# Patient Record
Sex: MALE | Race: WHITE | NOT HISPANIC OR LATINO | URBAN - METROPOLITAN AREA
[De-identification: names, ages, dates, MRNs, and addresses within clinical notes are randomized per-mention and may not be internally consistent; named-entity substitution may affect disease eponyms.]

---

## 2024-10-18 ENCOUNTER — OFFICE VISIT (OUTPATIENT)
Dept: URGENT CARE | Age: 34
End: 2024-10-18
Payer: COMMERCIAL

## 2024-10-18 VITALS
DIASTOLIC BLOOD PRESSURE: 88 MMHG | TEMPERATURE: 97.7 F | HEART RATE: 56 BPM | RESPIRATION RATE: 18 BRPM | BODY MASS INDEX: 28.03 KG/M2 | SYSTOLIC BLOOD PRESSURE: 134 MMHG | WEIGHT: 178.57 LBS | HEIGHT: 67 IN | OXYGEN SATURATION: 98 %

## 2024-10-18 DIAGNOSIS — R05.1 ACUTE COUGH: Primary | ICD-10-CM

## 2024-10-18 RX ORDER — BENZONATATE 200 MG/1
200 CAPSULE ORAL 3 TIMES DAILY PRN
Qty: 42 CAPSULE | Refills: 0 | Status: SHIPPED | OUTPATIENT
Start: 2024-10-18 | End: 2024-11-17

## 2024-10-18 RX ORDER — BROMPHENIRAMINE MALEATE, PSEUDOEPHEDRINE HYDROCHLORIDE, AND DEXTROMETHORPHAN HYDROBROMIDE 2; 30; 10 MG/5ML; MG/5ML; MG/5ML
10 SYRUP ORAL 4 TIMES DAILY PRN
Qty: 250 ML | Refills: 0 | Status: SHIPPED | OUTPATIENT
Start: 2024-10-18 | End: 2024-10-28

## 2024-10-18 ASSESSMENT — ENCOUNTER SYMPTOMS: COUGH: 1

## 2024-10-18 NOTE — PROGRESS NOTES
"Subjective   Patient ID: Osei Smith is a 33 y.o. male. They present today with a chief complaint of Cough (Cough x 2 weeks post nasal drip hoarse . Worse at night).    History of Present Illness  Patient is a pleasant 33-year-old white male, no significant past medical history, presented to clinic with chief complaint of cough.  Patient is reporting 1 week history of persistent dry cough that has persisted since he got over a upper respiratory infection about 1 week ago.  He denies any fever or chills.  No sputum production.  He denies any upper respiratory congestion however does report some postnasal drainage especially at night when his cough is worse.  Abdominal pain, nausea, vomiting.  No myalgias, arthralgias, generalized weakness, fatigue, numbness, tingling, or focal weakness.        Cough        Past Medical History  Allergies as of 10/18/2024    (No Known Allergies)       (Not in a hospital admission)         Past Medical History:   Diagnosis Date    Personal history of diseases of the skin and subcutaneous tissue     History of impetigo    Personal history of diseases of the skin and subcutaneous tissue     History of eczema    Personal history of other diseases of the respiratory system     History of sinusitis    Personal history of other mental and behavioral disorders     History of depression       No past surgical history on file.         Review of Systems  Review of Systems   Respiratory:  Positive for cough.         All review of systems negative unless stated in HPI.                           Objective    Vitals:    10/18/24 1551   BP: 134/88   Pulse: 56   Resp: 18   Temp: 36.5 °C (97.7 °F)   TempSrc: Oral   SpO2: 98%   Weight: 81 kg (178 lb 9.2 oz)   Height: 1.702 m (5' 7\")     No LMP for male patient.    Physical Exam  General: Vitals Noted. No distress. Normocephalic.     HEENT: TMs normal, EOMI, normal conjunctiva, patent nares with erythematous edematous and appear nasal turbinates and " clear rhinorrhea bilaterally.  Posterior oropharynx with signs of postnasal drainage without any erythema swelling or tonsillar exudate.  Uvula is in the midline and nonedematous.  No drooling.  No trismus.    Neck: Supple with no adenopathy.     Cardiac: Regular Rate and Rhythm. No murmur.     Pulmonary: Equal breath sounds bilaterally. No wheezes, rhonchi, or rales.    Abdomen: Soft, non-tender, with normal bowel sounds.     Musculoskeletal: Moves all extremities, no effusion, no edema.     Skin: No obvious rashes.  Procedures    Point of Care Test & Imaging Results from this visit    No results found.    Diagnostic study results (if any) were reviewed by Boyd Markham PA-C.    Assessment/Plan   Allergies, medications, history, and pertinent labs/EKGs/Imaging reviewed by Boyd Markham PA-C.     Medical Decision Making  Patient was seen eval in the clinic for complaint of persistent cough.  On exam patient is nontoxic well-appearing respect comfortably no acute distress.  Vital signs are stable, afebrile.  Chest clear, heart is regular, belly soft and nontender.  ENT exam as above significant for moderate amount of postnasal drainage.  I feel cough is likely secondary to upper respiratory congestion and drainage.  Will provide Bromfed-DM to use as needed for cough at night and provided Tessalon Perles to use as needed for cough during the day.  Minimal concern for acute bronchitis or pneumonia given very clear breath sounds at this time.  No concern for acute sinusitis.  No otitis media.  We discharged home at this time discharged to follow-up with primary care physician in the next week.  I reviewed my impression, plan, strict return precautions with the patient he expresses understanding and agreement with this plan.      Orders and Diagnoses  Diagnoses and all orders for this visit:  Acute cough  -     brompheniramine-pseudoeph-DM 2-30-10 mg/5 mL syrup; Take 10 mL by mouth 4 times a day as needed  for congestion or cough for up to 10 days.  -     benzonatate (Tessalon) 200 mg capsule; Take 1 capsule (200 mg) by mouth 3 times a day as needed for cough. Do not crush or chew.        Medical Admin Record      Follow Up Instructions  No follow-ups on file.    Patient disposition: Home    Electronically signed by Boyd Markham PA-C  4:24 PM

## 2025-04-04 ENCOUNTER — OFFICE VISIT (OUTPATIENT)
Dept: URGENT CARE | Age: 35
End: 2025-04-04
Payer: COMMERCIAL

## 2025-04-04 DIAGNOSIS — J02.9 SORE THROAT: Primary | ICD-10-CM

## 2025-04-04 ASSESSMENT — ENCOUNTER SYMPTOMS
SORE THROAT: 1
VOMITING: 0
SHORTNESS OF BREATH: 0
STRIDOR: 1
TROUBLE SWALLOWING: 1
VOICE CHANGE: 0
HOARSE VOICE: 1
SWOLLEN GLANDS: 1
DIARRHEA: 0
FATIGUE: 1
HEADACHES: 0
ABDOMINAL PAIN: 0
RHINORRHEA: 1
COUGH: 1
NECK PAIN: 0

## 2025-04-04 NOTE — PATIENT INSTRUCTIONS
As discussed, for safety reasons and proper evaluation please be seen in office for further evaluation

## 2025-04-04 NOTE — PROGRESS NOTES
Urgent Care Virtual Video Visit    Virtual or Telephone Consent  Patient confirmed by name and date of birth      An interactive audio and video telecommunication system which permits real time communications between the patient (at the originating site) and provider (at the distant site) was utilized to provide this telehealth service.   Verbal consent was requested and obtained from Osei Smith on this date, 04/04/25 for a telehealth visit and the patient's location was confirmed at the time of the visit.    Patient Location: Home  Provider Location: [facility]      Chief Complaint   Patient presents with    Sore Throat     X10 days       Past Medical History:   Diagnosis Date    Personal history of diseases of the skin and subcutaneous tissue     History of impetigo    Personal history of diseases of the skin and subcutaneous tissue     History of eczema    Personal history of other diseases of the respiratory system     History of sinusitis    Personal history of other mental and behavioral disorders     History of depression       Medication Documentation Review Audit       Reviewed by Kristin L Schoenlein, APRN-CNP (Nurse Practitioner) on 04/04/25 at 1737      Medication Order Taking? Sig Documenting Provider Last Dose Status            No Medications to Display                                   No Known Allergies    34-year-old male seen today via virtual visit with a complaint of sore throat.  Patient states has been sick for the last 10 days.  Accompanying symptoms are nasal congestion, runny nose, thick mucus, coughing up mucus, and pain with swallowing.  States wife was just seen for similar symptoms and was treated for strep throat.  He is unsure what she was treated with.  States been taking DayQuil and NyQuil which is helping  but he does not feel that he is getting better.  Denies inability to open mouth, inability to swallow, drooling.  Denies shortness of breath.  Has been taking his temperature, no fever, but is unsure if his thermometer is working correctly.      Sore Throat   Associated symptoms include congestion and coughing. Pertinent negatives include no headaches or shortness of breath.       Review of Systems   Constitutional:  Positive for fatigue.   HENT:  Positive for congestion, rhinorrhea and sore throat. Negative for voice change.    Respiratory:  Positive for cough. Negative for shortness of breath.    Cardiovascular:  Negative for chest pain.   Neurological:  Negative for headaches.       Physical Exam  Constitutional:       General: He is not in acute distress.     Appearance: Normal appearance. He is not ill-appearing or toxic-appearing.   HENT:      Head: Normocephalic and atraumatic.   Eyes:      Conjunctiva/sclera: Conjunctivae normal.   Pulmonary:      Effort: Pulmonary effort is normal.   Neurological:      General: No focal deficit present.      Mental Status: He is alert and oriented to person, place, and time.         MDM  Patient no acute distress, nontoxic-appearing, speaking in full sentences, without trismus, nasal retractions/flaring, stridor, tachypnea, increased work of breathing.  Patient is alert into x 3.  Due to lack of exam, I do not feel I can properly treat this patient.  Discussed this with patient.  Encouraged him to be seen in clinic for further follow-up.  All questions answered and addressed.  Patient is in agreement of plan of care and verbalized understanding.      Video visit completed with realtime synchronous video/audio connection. Informed consent was obtained from the patient. Patient was made aware that my evaluation and diagnosis are limited due to the fact that we are not in the same room during the interview and that this is a virtual encounter that took place via  videoconferencing. Patient verbalized understanding.     Patient disposition: Home

## 2025-04-05 ENCOUNTER — OFFICE VISIT (OUTPATIENT)
Dept: URGENT CARE | Age: 35
End: 2025-04-05
Payer: COMMERCIAL

## 2025-04-05 VITALS
OXYGEN SATURATION: 96 % | BODY MASS INDEX: 26.63 KG/M2 | RESPIRATION RATE: 18 BRPM | HEART RATE: 60 BPM | DIASTOLIC BLOOD PRESSURE: 77 MMHG | TEMPERATURE: 97.1 F | WEIGHT: 170 LBS | SYSTOLIC BLOOD PRESSURE: 122 MMHG

## 2025-04-05 DIAGNOSIS — J02.9 SORE THROAT: Primary | ICD-10-CM

## 2025-04-05 DIAGNOSIS — J01.90 ACUTE BACTERIAL SINUSITIS: ICD-10-CM

## 2025-04-05 DIAGNOSIS — B96.89 ACUTE BACTERIAL SINUSITIS: ICD-10-CM

## 2025-04-05 LAB
POC HUMAN RHINOVIRUS PCR: NEGATIVE
POC INFLUENZA A VIRUS PCR: NEGATIVE
POC INFLUENZA B VIRUS PCR: NEGATIVE
POC RESPIRATORY SYNCYTIAL VIRUS PCR: NEGATIVE
POC STREPTOCOCCUS PYOGENES (GROUP A STREP) PCR: NEGATIVE

## 2025-04-05 RX ORDER — AMOXICILLIN AND CLAVULANATE POTASSIUM 875; 125 MG/1; MG/1
875 TABLET, FILM COATED ORAL 2 TIMES DAILY
Qty: 20 TABLET | Refills: 0 | Status: SHIPPED | OUTPATIENT
Start: 2025-04-05 | End: 2025-04-15

## 2025-04-05 RX ORDER — METHYLPREDNISOLONE 4 MG/1
TABLET ORAL
Qty: 21 TABLET | Refills: 0 | Status: SHIPPED | OUTPATIENT
Start: 2025-04-05 | End: 2025-04-11

## 2025-04-05 ASSESSMENT — ENCOUNTER SYMPTOMS
SORE THROAT: 1
RHINORRHEA: 1
SINUS PAIN: 1
SINUS PRESSURE: 1
FATIGUE: 1

## 2025-04-05 NOTE — PROGRESS NOTES
Subjective   Patient ID: Osei Smith is a 34 y.o. male. They present today with a chief complaint of Sore Throat (For 2 weeks).    History of Present Illness  Patient is a 34-year-old male presenting today with symptoms of sore throat, nasal congestion, postnasal drip, headache and pressure.  Reports thick yellow with green nasal discharge.  Minimal cough.  He reports his symptoms going on for 2 weeks.  He has been seen virtually yesterday and advised to be seen in person.  He denies nausea, vomiting, or diarrhea.  He denies having chills or fever recently.  Patient does not have any allergies and is not on any prescription medications at home.    Past Medical History  Allergies as of 04/05/2025    (No Known Allergies)       (Not in a hospital admission)       Past Medical History:   Diagnosis Date    Personal history of diseases of the skin and subcutaneous tissue     History of impetigo    Personal history of diseases of the skin and subcutaneous tissue     History of eczema    Personal history of other diseases of the respiratory system     History of sinusitis    Personal history of other mental and behavioral disorders     History of depression       History reviewed. No pertinent surgical history.     reports that he has never smoked. He has never used smokeless tobacco.    Review of Systems  Review of Systems   Constitutional:  Positive for fatigue.   HENT:  Positive for congestion, postnasal drip, rhinorrhea, sinus pressure, sinus pain and sore throat.    All other systems reviewed and are negative.                                 Objective    Vitals:    04/05/25 0815   BP: 122/77   BP Location: Left arm   Patient Position: Sitting   BP Cuff Size: Adult   Pulse: 60   Resp: 18   Temp: 36.2 °C (97.1 °F)   TempSrc: Temporal   SpO2: 96%   Weight: 77.1 kg (170 lb)     No LMP for male patient.    Physical Exam  Vitals reviewed.   General: Vitals Noted. No distress. Normocephalic.  Cardiovascular:     Heart  sounds: Normal heart sounds, S1 normal and S2 normal. No murmur heard.     No friction rub.   Pulmonary:      Effort: Pulmonary effort is normal.      Breath sounds:  Lungs clear to auscultation bilaterally   HEENT: Left and right TM is within normal limits. No drainage.  EOMI, normal conjunctiva, patent nares, Normal OP, Noted maxillary and frontal sinus tenderness on palpation is present. Pharynx and tonsils are not hyperemic, and without exudate.   Neck: Supple with no adenopathy.  Lymphadenopathy:   No cervical adenopathy on palpation  Lower Body: No right inguinal adenopathy. No left inguinal adenopathy.   Abdominal:      Palpations: There is no hepatomegaly, splenomegaly or mass. Abdomen is soft, non-tender, and non-distended. No suprapubic tenderness. No CVA tenderness.   Skin:     Comments: no rash   Neurological:      Cranial Nerves: Cranial nerves 2-12 are intact.      Sensory: No sensory deficit.      Motor: Motor function is intact.      Deep Tendon Reflexes: Reflexes are normal and symmetric.       Procedures    Point of Care Test & Imaging Results from this visit  Results for orders placed or performed in visit on 04/05/25   POCT SPOTFIRE R/ST Panel Mini w/Strep A ([facility]) manually resulted   Result Value Ref Range    POC Group A Strep, PCR Negative Negative    POC Respiratory Syncytial Virus PCR Negative Negative    POC Influenza A Virus PCR Negative Negative    POC Influenza B Virus PCR Negative Negative    POC Human Rhinovirus PCR Negative Negative      Imaging  No results found.    Cardiology, Vascular, and Other Imaging  No other imaging results found for the past 2 days      Diagnostic study results (if any) were reviewed by ROSALIO Zarco.    Assessment/Plan   Allergies, medications, history, and pertinent labs/EKGs/Imaging reviewed by ROSALIO Zarco.     Medical Decision Making  Patient presents with symptoms of nasal congestion, cough, worsening thick green nasal  discharge, sinus and facial pressure and pain. Symptoms getting worse. Concerns for URI vs Strep vs Covid vs Flu vs Sinus infection. Strep spot fire is negative in the room, No concerns for bronchitis or PNA given lungs are clear on auscultations without wheezing or rhonchi. Noted facial pressure with maxillary and frontal sinus tenderness on palpation. Purulent nasal discharge. Will treat for bacterial sinusitis with Augmentin PO BID x10 days. Will add medrol pack to help with severity of congestion and inflammation. Patient agreed with the plan.      Orders and Diagnoses  Diagnoses and all orders for this visit:  Sore throat  -     POCT SPOTFIRE R/ST Panel Mini w/Strep A ([facility]) manually resulted  Acute bacterial sinusitis  -     amoxicillin-pot clavulanate (Augmentin) 875-125 mg tablet; Take 1 tablet (875 mg) by mouth 2 times a day for 10 days.  -     methylPREDNISolone (Medrol Dospak) 4 mg tablets; Follow schedule on package instructions      Medical Admin Record      Patient disposition: Home    Electronically signed by KATE Zarco-REGINA  9:25 AM

## 2025-08-07 ENCOUNTER — OFFICE VISIT (OUTPATIENT)
Dept: URGENT CARE | Age: 35
End: 2025-08-07
Payer: COMMERCIAL

## 2025-08-07 VITALS
HEART RATE: 69 BPM | DIASTOLIC BLOOD PRESSURE: 84 MMHG | SYSTOLIC BLOOD PRESSURE: 134 MMHG | RESPIRATION RATE: 16 BRPM | OXYGEN SATURATION: 97 % | WEIGHT: 170 LBS | TEMPERATURE: 98.4 F | BODY MASS INDEX: 26.68 KG/M2 | HEIGHT: 67 IN

## 2025-08-07 DIAGNOSIS — M54.41 ACUTE RIGHT-SIDED LOW BACK PAIN WITH RIGHT-SIDED SCIATICA: Primary | ICD-10-CM

## 2025-08-07 RX ORDER — METHYLPREDNISOLONE 4 MG/1
TABLET ORAL
Qty: 21 TABLET | Refills: 0 | Status: SHIPPED | OUTPATIENT
Start: 2025-08-07 | End: 2025-08-13

## 2025-08-07 RX ORDER — KETOROLAC TROMETHAMINE 15 MG/ML
15 INJECTION, SOLUTION INTRAMUSCULAR; INTRAVENOUS ONCE
Status: COMPLETED | OUTPATIENT
Start: 2025-08-07 | End: 2025-08-07

## 2025-08-07 RX ORDER — METHYLPREDNISOLONE SODIUM SUCCINATE 125 MG/2ML
60 INJECTION INTRAMUSCULAR; INTRAVENOUS ONCE
Status: COMPLETED | OUTPATIENT
Start: 2025-08-07 | End: 2025-08-07

## 2025-08-07 RX ORDER — CYCLOBENZAPRINE HCL 10 MG
5 TABLET ORAL 3 TIMES DAILY PRN
Qty: 15 TABLET | Refills: 0 | Status: SHIPPED | OUTPATIENT
Start: 2025-08-07

## 2025-08-07 RX ADMIN — KETOROLAC TROMETHAMINE 15 MG: 15 INJECTION, SOLUTION INTRAMUSCULAR; INTRAVENOUS at 12:02

## 2025-08-07 RX ADMIN — METHYLPREDNISOLONE SODIUM SUCCINATE 60 MG: 125 INJECTION INTRAMUSCULAR; INTRAVENOUS at 12:04

## 2025-08-07 ASSESSMENT — ENCOUNTER SYMPTOMS
PSYCHIATRIC NEGATIVE: 1
ENDOCRINE NEGATIVE: 1
RESPIRATORY NEGATIVE: 1
ALLERGIC/IMMUNOLOGIC NEGATIVE: 1
HEMATOLOGIC/LYMPHATIC NEGATIVE: 1
EYES NEGATIVE: 1
CARDIOVASCULAR NEGATIVE: 1
GASTROINTESTINAL NEGATIVE: 1
NEUROLOGICAL NEGATIVE: 1
CONSTITUTIONAL NEGATIVE: 1
BACK PAIN: 1

## 2025-08-07 NOTE — PROGRESS NOTES
"Subjective   Patient ID: Osei Smith is a 34 y.o. male. They present today with a chief complaint of Back Pain (Lower back pain is radiating down right light /Started 1 month ago pain has worsened /\"Patient states 6 months ago while at the gym he heard a pop and pain has been lingering\").    History of Present Illness  Per patient, lower back pain with radiation to the right leg with onset x 1 month. He is seeing chiropractor and recently started PT. He had x-ray done which showed L4-L5 narrowing. He states he has increase in pain with driving. He does c/o numbness and tingling in right leg/buttocks.  No loss of control of bowels or bladder. No weakness. He denies chest pain, SOB, dizziness, headache, or palpitations. He has no other concerns to address at this time.       Back Pain        Past Medical History  Allergies as of 08/07/2025    (No Known Allergies)       Prescriptions Prior to Admission[1]     Medical History[2]    Surgical History[3]     reports that he has never smoked. He has never been exposed to tobacco smoke. He has never used smokeless tobacco.    Review of Systems  Review of Systems   Constitutional: Negative.    HENT: Negative.     Eyes: Negative.    Respiratory: Negative.     Cardiovascular: Negative.    Gastrointestinal: Negative.    Endocrine: Negative.    Genitourinary: Negative.    Musculoskeletal:  Positive for back pain.   Skin: Negative.    Allergic/Immunologic: Negative.    Neurological: Negative.    Hematological: Negative.    Psychiatric/Behavioral: Negative.     All other systems reviewed and are negative.                                 Objective    Vitals:    08/07/25 1125   BP: 134/84   Pulse: 69   Resp: 16   Temp: 36.9 °C (98.4 °F)   SpO2: 97%   Weight: 77.1 kg (170 lb)   Height: 1.702 m (5' 7\")     No LMP for male patient.    Physical Exam  Vitals and nursing note reviewed.   Constitutional:       General: He is not in acute distress.     Appearance: Normal appearance. He " is not ill-appearing or toxic-appearing.   HENT:      Head: Normocephalic.      Ears:      Comments: Answering all questions appropriately.      Nose: Nose normal.      Mouth/Throat:      Mouth: Mucous membranes are moist.      Pharynx: Oropharynx is clear.     Eyes:      Conjunctiva/sclera: Conjunctivae normal.       Cardiovascular:      Rate and Rhythm: Normal rate and regular rhythm.      Pulses: Normal pulses.      Heart sounds: Normal heart sounds.      Comments: 2+P radial pulses   Pulmonary:      Effort: Pulmonary effort is normal.      Comments: Talking in complete sentences with no accessory muscle use.     Musculoskeletal:         General: No tenderness. Normal range of motion.      Cervical back: Normal range of motion.      Comments: Lower back pain. No tenderness on palpation.      Skin:     General: Skin is warm and dry.      Capillary Refill: Capillary refill takes less than 2 seconds.     Neurological:      Mental Status: He is alert and oriented to person, place, and time.     Psychiatric:         Mood and Affect: Mood normal.         Behavior: Behavior normal.         Procedures    Point of Care Test & Imaging Results from this visit  No results found for this visit on 08/07/25.   Imaging  No results found.    Cardiology, Vascular, and Other Imaging  No other imaging results found for the past 2 days      Diagnostic study results (if any) were reviewed by ROSALIO Larsen.    Assessment/Plan   Allergies, medications, history, and pertinent labs/EKGs/Imaging reviewed by ROSALIO Larsen.     Medical Decision Making    Patient presents to the urgent care for lower back pain with radiation to the right leg with onset x 1 month. He is seeing chiropractor and recently started PT. He had x-ray done which showed L4-L5 narrowing. He states he has increase in pain with driving. He does c/o numbness and tingling in right leg/buttocks.  No loss of control of bowels or bladder. No weakness. He  denies chest pain, SOB, dizziness, headache, or palpitations. Toradol and Solu Medrol IM given in office. Rx for Medrol dose pack (start tomorrow) and cyclobenzaprine. Continue PT and care with chiropractor. At time of discharge patient was clinically well-appearing and HDS for outpatient management. The patient was educated regarding diagnosis, supportive care, OTC and Rx medications. The patient was given the opportunity to ask questions prior to discharge.  He verbalized understanding of my discussion of the plans for treatment, expected course, indications to return to  or seek further evaluation in ED, and the need for timely follow up as directed.         Orders and Diagnoses  Diagnoses and all orders for this visit:  Acute right-sided low back pain with right-sided sciatica  -     ketorolac (Toradol) injection 15 mg  -     methylPREDNISolone sodium succinate (PF) (SOLU-Medrol) injection 60 mg  -     methylPREDNISolone (Medrol Dospak) 4 mg tablets; Follow schedule on package instructions  -     cyclobenzaprine (Flexeril) 10 mg tablet; Take 0.5 tablets (5 mg) by mouth 3 times a day as needed for muscle spasms.      Medical Admin Record  Administrations This Visit       ketorolac (Toradol) injection 15 mg       Admin Date  08/07/2025 Action  Given Dose  15 mg Route  intramuscular Documented By  Cassandra Davenport MA              methylPREDNISolone sodium succinate (PF) (SOLU-Medrol) injection 60 mg       Admin Date  08/07/2025 Action  Given Dose  60 mg Route  intramuscular Documented By  Cassandra Davenport MA                    Patient disposition: Home    Electronically signed by ROSALIO Larsen  12:34 PM           [1] (Not in a hospital admission)   [2]   Past Medical History:  Diagnosis Date    Personal history of diseases of the skin and subcutaneous tissue     History of impetigo    Personal history of diseases of the skin and subcutaneous tissue     History of eczema    Personal history of other diseases  of the respiratory system     History of sinusitis    Personal history of other mental and behavioral disorders     History of depression   [3] History reviewed. No pertinent surgical history.

## 2025-08-07 NOTE — PATIENT INSTRUCTIONS
-Rest  -Do not take Motrin/NSAID with medrol dose pack   -Keep appointments with PT   -Medication as directed   -Can take Tylenol over the counter as needed for pain   -Follow up with your PCP if not improved in 2-3 days   -Lidocaine patches as directed. Can use up to 12 hours  -Heat and ice   -Back stretches and exercises   -Avoid heavy lifting and avoid activities that may cause pain   -Stay active helps speed recovery